# Patient Record
Sex: FEMALE | Race: BLACK OR AFRICAN AMERICAN | NOT HISPANIC OR LATINO | Employment: FULL TIME | URBAN - NONMETROPOLITAN AREA
[De-identification: names, ages, dates, MRNs, and addresses within clinical notes are randomized per-mention and may not be internally consistent; named-entity substitution may affect disease eponyms.]

---

## 2017-03-17 ENCOUNTER — OFFICE VISIT (OUTPATIENT)
Dept: OBSTETRICS AND GYNECOLOGY | Facility: CLINIC | Age: 37
End: 2017-03-17

## 2017-03-17 VITALS
SYSTOLIC BLOOD PRESSURE: 102 MMHG | BODY MASS INDEX: 32.8 KG/M2 | HEIGHT: 67 IN | WEIGHT: 209 LBS | DIASTOLIC BLOOD PRESSURE: 60 MMHG

## 2017-03-17 DIAGNOSIS — M79.18 MYOFASCIAL PAIN DYSFUNCTION SYNDROME: Primary | ICD-10-CM

## 2017-03-17 PROCEDURE — 99213 OFFICE O/P EST LOW 20 MIN: CPT | Performed by: OBSTETRICS & GYNECOLOGY

## 2017-03-17 NOTE — PROGRESS NOTES
No chief complaint on file.    Trung Childress is a 36 y.o. year old No obstetric history on file..  No LMP recorded. Patient has had an ablation.  She presents with a chief complaint of wanting to have a hysterectomy done secondary to pain.  The patient underwent an endometrial ablation in 2013 for menorrhagia and she's had an occasional period.  Most recent menses was in the past month she just used regular pads.  She passed a couple small clots.  And there was no pain with that.  But she does note she has right lower quadrant pain.  Nothing seems to make that better or worse.  She underwent an ultrasound for that back in October which showed an enlarged uterus with findings compatible with a leiomyoma of the right left ovaries appear to be within normal limits..         Past Medical History   Diagnosis Date   • Acquired hypothyroidism    • Anemia    • Cervicovaginal cytology normal or benign      Low grade squamous intraepithelial lesion - With normal-appearing cervix      • Chest pain    • Chronic fatigue syndrome    • Excessive or frequent menstruation    • GERD (gastroesophageal reflux disease)    • Iron deficiency anemia    • Pain in pelvis    • Vitamin D deficiency      Past Surgical History   Procedure Laterality Date   • Endometrial ablation  04/17/2013     Hysteroscopy with endometrial ablation. Menorrhagia and anemmia       Current Outpatient Prescriptions:   •  levothyroxine (SYNTHROID, LEVOTHROID) 175 MCG tablet, Take 175 mcg by mouth daily., Disp: , Rfl:   No Known Allergies  Smoking status: Never Smoker                                                              Smokeless status: Never Used                        Review of Systems   Constitutional: Negative for activity change, appetite change, chills and fever.   Gastrointestinal: Positive for abdominal pain. Negative for abdominal distention.   Genitourinary: Positive for pelvic pain. Negative for difficulty urinating, dysuria, flank  "pain, genital sores, vaginal bleeding, vaginal discharge and vaginal pain.        Visit Vitals   • /60   • Ht 67\" (170.2 cm)   • Wt 209 lb (94.8 kg)   • BMI 32.73 kg/m2       General:  well developed; well nourished  no acute distress   Thyroid: not examined   Lungs:  breathing is unlabored   Heart:  Not performed.   Breasts:  Not performed.   Abdomen: soft, non-tender; no masses   Pelvis: Clinical staff was present for exam  External genitalia:  normal appearance of the external genitalia including Bartholin's and Burlingame's glands.  Vaginal:  There is tenderness along the lateral vaginal sidewalls particular on the left side.  The tenderness is most pronounced over the bony areas.  Cervix:  normal appearance. The cervix is nontender  Uterus:  normal size, shape and consistency. The uterus is nontender  Adnexa:  normal bimanual exam of the adnexa.  Rectal:  digital rectal exam not performed; anus visually normal appearing.     Lab Review   No data reviewed      Imaging   Pelvic ultrasound report    Assessment      Diagnosis Plan   1. Myofascial pain dysfunction syndrome            Plan   1. I told the patient that I thought she probably had myofascial pain of the pelvis.  I told her I did not think that a hysterectomy would improve this pain and that she should consider physical therapy.  She's going to consider that.         This note was electronically signed.    Norberto Soriano MD  March 17, 2017  "